# Patient Record
Sex: FEMALE | Race: AMERICAN INDIAN OR ALASKA NATIVE | NOT HISPANIC OR LATINO | URBAN - METROPOLITAN AREA
[De-identification: names, ages, dates, MRNs, and addresses within clinical notes are randomized per-mention and may not be internally consistent; named-entity substitution may affect disease eponyms.]

---

## 2023-11-25 ENCOUNTER — OFFICE VISIT (OUTPATIENT)
Dept: FAMILY MEDICINE | Facility: CLINIC | Age: 33
End: 2023-11-25

## 2023-11-25 ENCOUNTER — NURSE TRIAGE (OUTPATIENT)
Dept: NURSING | Facility: CLINIC | Age: 33
End: 2023-11-25

## 2023-11-25 VITALS
OXYGEN SATURATION: 99 % | HEART RATE: 96 BPM | TEMPERATURE: 97 F | DIASTOLIC BLOOD PRESSURE: 74 MMHG | SYSTOLIC BLOOD PRESSURE: 128 MMHG | RESPIRATION RATE: 18 BRPM | WEIGHT: 198 LBS

## 2023-11-25 DIAGNOSIS — L08.9 WOUND INFECTION: ICD-10-CM

## 2023-11-25 DIAGNOSIS — L03.115 CELLULITIS OF RIGHT LOWER EXTREMITY: Primary | ICD-10-CM

## 2023-11-25 DIAGNOSIS — T14.8XXA WOUND INFECTION: ICD-10-CM

## 2023-11-25 PROCEDURE — 99203 OFFICE O/P NEW LOW 30 MIN: CPT | Mod: 25 | Performed by: PHYSICIAN ASSISTANT

## 2023-11-25 PROCEDURE — 96372 THER/PROPH/DIAG INJ SC/IM: CPT | Performed by: PHYSICIAN ASSISTANT

## 2023-11-25 RX ORDER — OMEPRAZOLE 40 MG/1
40 CAPSULE, DELAYED RELEASE ORAL DAILY
COMMUNITY

## 2023-11-25 RX ORDER — NAPROXEN 500 MG/1
TABLET ORAL 2 TIMES DAILY WITH MEALS
COMMUNITY

## 2023-11-25 RX ORDER — SULFAMETHOXAZOLE/TRIMETHOPRIM 800-160 MG
1 TABLET ORAL 2 TIMES DAILY
Qty: 20 TABLET | Refills: 0 | Status: SHIPPED | OUTPATIENT
Start: 2023-11-25 | End: 2023-12-05

## 2023-11-25 RX ORDER — CEFTRIAXONE 1 G/1
1000 INJECTION, POWDER, FOR SOLUTION INTRAMUSCULAR; INTRAVENOUS ONCE
Qty: 1 EACH | Refills: 0 | Status: SHIPPED | OUTPATIENT
Start: 2023-11-25 | End: 2023-11-25

## 2023-11-25 RX ORDER — CEFTRIAXONE SODIUM 1 G
1 VIAL (EA) INJECTION ONCE
Status: COMPLETED | OUTPATIENT
Start: 2023-11-25 | End: 2023-11-25

## 2023-11-25 RX ADMIN — Medication 1 G: at 14:35

## 2023-11-25 NOTE — TELEPHONE ENCOUNTER
Nurse Triage SBAR    Is this a 2nd Level Triage? NO    Situation: Patient calling with various skin issues that have been on going over the last couple weeks  Consent: not needed    Background: NA    Assessment: scraped her knee- indicates that she scraped her knee  Put hydrocortisone on her knee   Indicates she has in gown hairs coming up on her body  Right knee- 1 week ago  Inch  24 hours of hydrocortisone - cleaning and taken care of   Has been keeping bandage on- draining and orange clear drainage  She indicates that this wound is very deep and is swelling  There is spreading redness from the wound  Spots on her body- her right thighs (6) and on her thumb- left (1)   - started off as pimples but they got worse- larger  - look like an ingrown hair  - indicates painful  - opened up and got worse  - thigh, quarter of an inch  - note spreading redness from the area  - draining blood, not scabbing   - indicates wound on thumb looks like a blister and is red    Indicates that she has other wounds on her body that are not healing well     Taking tylenol, omeprazole, Ibuprofen    Protocol Recommended Disposition:       Recommendation: Advised to be seen today to be evaluated. Reviewed additional care advice with patient and she verbalizes understanding. Reviewed location of [facility] and patient will go there to be seen.      [facility] today    Does the patient meet one of the following criteria for ADS visit consideration? No      Ada Edouard RN 6:45 AM 11/25/2023  Reason for Disposition   [1] Looks infected (spreading redness, pus) AND [2] no fever   Large or small blisters on skin (i.e., fluid filled bubbles or sacs)    Additional Information   Negative: [1] Major bleeding (e.g., actively dripping or spurting) AND [2] can't be stopped   Negative: Amputation   Negative: Shock suspected (e.g., cold/pale/clammy skin, too weak to stand, low BP, rapid pulse)   Negative: [1] Knife wound (or other possibly deep cut)  AND [2] to chest, abdomen, back, neck, or head   Negative: Sounds like a life-threatening emergency to the triager   Negative: Animal bite   Negative: Human bite   Negative: Puncture wound   Negative: Foreign body in skin (e.g., splinter, sliver)   Negative: Bruises not from an injury   Negative: Electrical burn   Negative: Chemical burn   Negative: Thermal burn   Negative: Wound infection suspected   Negative: [1] Bleeding AND [2] won't stop after 10 minutes of direct pressure (using correct technique)   Negative: Skin is split open or gaping (or length > 1/2 inch or 12 mm on the skin, 1/4 inch or 6 mm on the face)   Negative: [1] Deep cut AND [2] can see bone or tendons   Negative: Skin loss goes very deep (e.g., can see bones or tendons)   Negative: Skin loss involves more than 10% of body surface area (BSA)   Negative: [1] Dirt in the wound AND [2] not removed with 15 minutes of scrubbing   Negative: High pressure injection injury (e.g., from grease gun or paint gun, usually work-related)   Negative: Wound causes numbness (i.e., loss of sensation)   Negative: Wound causes weakness (i.e., decreased ability to move hand, finger, toe)   Negative: Sounds like a serious injury to the triager   Negative: [1] SEVERE pain AND [2] not improved 2 hours after pain medicine/ice packs   Negative: [1] Looks infected AND [2] large red area (> 2 inches or 5 cm) or streak   Negative: [1] Looks infected (spreading redness, red streak, pus) AND [2] fever   Negative: Suspicious history for the injury   Negative: [1] Raised bruise AND [2] size > 2 inches (5 cm) AND [3] expanding   Negative: [1] Life-threatening reaction (anaphylaxis) in the past to similar substance (e.g., food, insect bite/sting, chemical, etc.) AND [2] < 2 hours since exposure   Negative: [1] Sudden onset of rash (within last 2 hours) AND [2] difficulty breathing or swallowing   Negative: Shock suspected (e.g., cold/pale/clammy skin, too weak to stand, low BP,  rapid pulse)   Negative: Difficult to awaken or acting confused (e.g., disoriented, slurred speech)   Negative: [1] Purple or blood-colored spots or dots AND [2] fever   Negative: Sounds like a life-threatening emergency to the triager   Negative: [1] Drug rash suspected AND [2] started taking new medicine within last 2 weeks  (Exception: Antihistamine, eye drops, ear drops, decongestant or other OTC cough/cold medicines.)   Negative: [1] Chickenpox suspected AND [2] known exposure to chickenpox in past 3 weeks   Negative: Hives suspected   Negative: Insect bites suspected   Negative: [1] Measles suspected AND [2] known exposure to measles in past 3 weeks   Negative: [1] Mpox suspected (e.g., direct skin contact such as sex, recent travel to [place] or [place]) AND [2] symptoms of Mpox (e.g., rash, fever, muscle aches, or swollen lymph nodes)   Negative: [1] At risk for Mpox (men-who-have-sex-with-men) AND [2] possible exposure (e.g., multiple sex partners in past 21 days) AND [3] symptoms of Mpox (e.g., rash, fever, muscle aches, or swollen lymph nodes)   Negative: Swimmer's Itch suspected   Negative: Sunburn suspected   Negative: [1] Bright red, sunburn-like rash AND [2] current tampon use or nasal packing   Negative: [1] Bright red, sunburn-like rash AND [3] wound infection or recent surgery   Negative: [1] Bright red skin AND [2] peels off in sheets   Negative: Stiff neck (can't touch chin to chest)   Negative: Fever   Negative: Joint pain or swelling   Negative: Patient sounds very sick or weak to the triager   Negative: [1] Purple or blood-colored rash (spots or dots) AND [2] no fever AND [3] sounds well to triager    Protocols used: Skin Injury-A-AH, Rash or Redness - Widespread-A-AH

## 2023-11-25 NOTE — PROGRESS NOTES
SUBJECTIVE:   Suzie Miller is a 33 year old female presenting with a chief complaint of   Chief Complaint   Patient presents with    Urgent Care     Scrap rt knee x2 weeks, Lt thumb infection sore. Small rash or bumps through out arm.         She is a new patient of [place].    -fell and scraped her right knee 2 weeks ago  -noticed that it was red, swollen and draining today  -no fevers  -pain is up      -also has a wound on her upper right thigh and her left thumb  -no known cause    -no h/o Diabetes.  No known h/o MRSA  -TDaP updated in 2020    Review of Systems   All other systems reviewed and are negative.      No past medical history on file.  No family history on file.  Current Outpatient Medications   Medication Sig Dispense Refill    naproxen (EC-NAPROSYN) 500 MG EC tablet Take by mouth 2 times daily (with meals)      omeprazole (PRILOSEC) 40 MG DR capsule Take 40 mg by mouth daily       Social History     Tobacco Use    Smoking status: Every Day     Types: Cigarettes    Smokeless tobacco: Current   Substance Use Topics    Alcohol use: Not on file       OBJECTIVE  /74   Pulse 96   Temp 97  F (36.1  C) (Oral)   Resp 18   Wt 89.8 kg (198 lb)   SpO2 99%     Physical Exam  Vitals and nursing note reviewed.   Cardiovascular:      Rate and Rhythm: Normal rate and regular rhythm.   Pulmonary:      Effort: Pulmonary effort is normal.      Breath sounds: Normal breath sounds.   Musculoskeletal:         General: Normal range of motion.   Skin:     General: Skin is warm.      Findings: Abrasion, erythema and wound present.      Comments: 3cm abrasion to the right anterior knee.  Surrounding erythema noted.  Purulent drainage from the wound.      Area is anesthetized with 4cc lidocaine 1% and thoroughly cleaned        Labs:  No results found for this or any previous visit (from the past 24 hour(s)).      ASSESSMENT:      ICD-10-CM    1. Cellulitis of right lower extremity  L03.115 cefTRIAXone  (ROCEPHIN) in lidocaine 1% (PF) for IM administration 1 g     DISCONTINUED: cefTRIAXone (ROCEPHIN) 1 GM vial      2. Wound infection  T14.8XXA cefTRIAXone (ROCEPHIN) in lidocaine 1% (PF) for IM administration 1 g    L08.9 DISCONTINUED: cefTRIAXone (ROCEPHIN) 1 GM vial           Medical Decision Making:    Differential Diagnosis:  Cellulitis, abscess, abrasion    Serious Comorbid Conditions:  Adult:  None    PLAN:    -cleaned today  -will treat with Rocephin 1g IM today and oral Bactrim  -dressed today in clinic.  To change daily  -follow-up in 2 days.  Sooner with worsening.      Followup:    In 2  day(s) follow up for wound check    There are no Patient Instructions on file for this visit.